# Patient Record
Sex: MALE | Race: OTHER
[De-identification: names, ages, dates, MRNs, and addresses within clinical notes are randomized per-mention and may not be internally consistent; named-entity substitution may affect disease eponyms.]

---

## 2017-10-09 ENCOUNTER — HOSPITAL ENCOUNTER (OUTPATIENT)
Dept: HOSPITAL 62 - RAD | Age: 67
End: 2017-10-09
Attending: SURGERY
Payer: MEDICARE

## 2017-10-09 DIAGNOSIS — K80.20: Primary | ICD-10-CM

## 2017-10-09 PROCEDURE — 76705 ECHO EXAM OF ABDOMEN: CPT

## 2017-10-09 NOTE — RADIOLOGY REPORT (SQ)
EXAM DESCRIPTION:  U/S ABDOMEN LIMITED W/O DOP



COMPLETED DATE/TIME:  10/9/2017 9:48 am



REASON FOR STUDY:  CALC OF GALLBLADDER WITHOUT CHOLECYSTITIS K80.20  CALCULUS OF GALLBLADDER W/O CHOL
ECYSTITIS W/O OBSTRUC



COMPARISON:  CT chest 4/26/2016



TECHNIQUE:  Dynamic and static grayscale images acquired of the abdomen and recorded on PACS. Additio
nal selected color Doppler and spectral images recorded.



LIMITATIONS:  None.



FINDINGS:  PANCREAS: Midline pancreas unremarkable

LIVER: Difficult to penetrate with the ultrasound energy from diffuse fatty infiltration.  No gross m
asses.

LIVER VASCULATURE: Normal directional flow of the main portal vein and hepatic veins.

GALLBLADDER: Contracted, multiple shadowing stones are present.  No definite gallbladder wall thicken
ing far pericholecystic fluid.

ULTRASOUND-DETECTED DONOHUE'S SIGN: Negative.

INTRAHEPATIC DUCTS AND COMMON DUCT: CBD and intrahepatic ducts normal caliber. No filling defects.

INFERIOR VENA CAVA: Not well seen

AORTA: No aneurysm.

RIGHT KIDNEY:  Normal size. Normal echogenicity. No solid or suspicious masses.  Right kidney 1.6 cm 
in length.  No hydronephrosis. No calcifications.

PERITONEAL AND RIGHT PLEURAL SPACE: No ascites or effusions.

OTHER: No other significant findings.



IMPRESSION:  Gallbladder contracted around multiple stones.  No pericholecystic fluid or definite gal
lbladder wall thickening.  Negative sonographic Donohue's sign

Echogenic liver difficult to penetrate with the ultrasound energy likely from fatty infiltration



TECHNICAL DOCUMENTATION:  JOB ID:  [id number]

 2011 [employer]- All Rights Reserved

## 2018-06-06 ENCOUNTER — HOSPITAL ENCOUNTER (OUTPATIENT)
Dept: HOSPITAL 62 - SC | Age: 68
Discharge: HOME | End: 2018-06-06
Attending: OPHTHALMOLOGY
Payer: MEDICARE

## 2018-06-06 DIAGNOSIS — Z96.653: ICD-10-CM

## 2018-06-06 DIAGNOSIS — H25.11: Primary | ICD-10-CM

## 2018-06-06 DIAGNOSIS — I10: ICD-10-CM

## 2018-06-06 DIAGNOSIS — Z87.891: ICD-10-CM

## 2018-06-06 DIAGNOSIS — Z79.899: ICD-10-CM

## 2018-06-06 DIAGNOSIS — H40.1111: ICD-10-CM

## 2018-06-06 PROCEDURE — 66984 XCAPSL CTRC RMVL W/O ECP: CPT

## 2018-06-06 PROCEDURE — V2630 ANTER CHAMBER INTRAOCUL LENS: HCPCS

## 2018-06-06 PROCEDURE — 0191T: CPT

## 2018-06-06 PROCEDURE — C1783 OCULAR IMP, AQUEOUS DRAIN DE: HCPCS

## 2018-06-06 RX ADMIN — CYCLOPENTOLATE HYDROCHLORIDE AND PHENYLEPHRINE HYDROCHLORIDE PRN DROP: 2; 10 SOLUTION/ DROPS OPHTHALMIC at 09:48

## 2018-06-06 RX ADMIN — TETRACAINE HYDROCHLORIDE PRN DROP: 25 LIQUID OPHTHALMIC at 09:49

## 2018-06-06 RX ADMIN — CYCLOPENTOLATE HYDROCHLORIDE AND PHENYLEPHRINE HYDROCHLORIDE PRN DROP: 2; 10 SOLUTION/ DROPS OPHTHALMIC at 10:08

## 2018-06-06 RX ADMIN — TROPICAMIDE PRN DROP: 10 SOLUTION/ DROPS OPHTHALMIC at 09:48

## 2018-06-06 RX ADMIN — BESIFLOXACIN PRN DROP: 6 SUSPENSION OPHTHALMIC at 10:31

## 2018-06-06 RX ADMIN — TROPICAMIDE PRN DROP: 10 SOLUTION/ DROPS OPHTHALMIC at 09:58

## 2018-06-06 RX ADMIN — BESIFLOXACIN PRN DROP: 6 SUSPENSION OPHTHALMIC at 09:59

## 2018-06-06 RX ADMIN — TETRACAINE HYDROCHLORIDE PRN DROP: 25 LIQUID OPHTHALMIC at 10:11

## 2018-06-06 RX ADMIN — TETRACAINE HYDROCHLORIDE PRN DROP: 25 LIQUID OPHTHALMIC at 10:08

## 2018-06-06 RX ADMIN — TROPICAMIDE PRN DROP: 10 SOLUTION/ DROPS OPHTHALMIC at 10:08

## 2018-06-06 RX ADMIN — BESIFLOXACIN PRN DROP: 6 SUSPENSION OPHTHALMIC at 09:49

## 2018-06-06 RX ADMIN — CYCLOPENTOLATE HYDROCHLORIDE AND PHENYLEPHRINE HYDROCHLORIDE PRN DROP: 2; 10 SOLUTION/ DROPS OPHTHALMIC at 09:58

## 2018-06-20 ENCOUNTER — HOSPITAL ENCOUNTER (OUTPATIENT)
Dept: HOSPITAL 62 - SC | Age: 68
Discharge: HOME | End: 2018-06-20
Attending: OPHTHALMOLOGY
Payer: MEDICARE

## 2018-06-20 DIAGNOSIS — H25.12: Primary | ICD-10-CM

## 2018-06-20 DIAGNOSIS — Z79.899: ICD-10-CM

## 2018-06-20 DIAGNOSIS — Z96.653: ICD-10-CM

## 2018-06-20 DIAGNOSIS — I10: ICD-10-CM

## 2018-06-20 DIAGNOSIS — H40.1121: ICD-10-CM

## 2018-06-20 DIAGNOSIS — Z98.41: ICD-10-CM

## 2018-06-20 PROCEDURE — 66984 XCAPSL CTRC RMVL W/O ECP: CPT

## 2018-06-20 PROCEDURE — 0191T: CPT

## 2018-06-20 PROCEDURE — V2630 ANTER CHAMBER INTRAOCUL LENS: HCPCS

## 2018-06-20 PROCEDURE — C1783 OCULAR IMP, AQUEOUS DRAIN DE: HCPCS

## 2018-06-20 RX ADMIN — TOBRAMYCIN AND DEXAMETHASONE ONE APPLIC: 3; 1 OINTMENT OPHTHALMIC at 09:34

## 2018-06-20 RX ADMIN — KETOROLAC TROMETHAMINE PRN DROP: 4.5 SOLUTION/ DROPS OPHTHALMIC at 08:31

## 2018-06-20 RX ADMIN — CYCLOPENTOLATE HYDROCHLORIDE AND PHENYLEPHRINE HYDROCHLORIDE PRN DROP: 2; 10 SOLUTION/ DROPS OPHTHALMIC at 08:50

## 2018-06-20 RX ADMIN — BESIFLOXACIN PRN DROP: 6 SUSPENSION OPHTHALMIC at 08:41

## 2018-06-20 RX ADMIN — TROPICAMIDE PRN DROP: 10 SOLUTION/ DROPS OPHTHALMIC at 08:50

## 2018-06-20 RX ADMIN — TROPICAMIDE PRN DROP: 10 SOLUTION/ DROPS OPHTHALMIC at 08:30

## 2018-06-20 RX ADMIN — TOBRAMYCIN AND DEXAMETHASONE ONE APPLIC: 3; 1 OINTMENT OPHTHALMIC at 09:43

## 2018-06-20 RX ADMIN — KETOROLAC TROMETHAMINE PRN DROP: 4.5 SOLUTION/ DROPS OPHTHALMIC at 08:30

## 2018-06-20 RX ADMIN — TETRACAINE HYDROCHLORIDE PRN DROP: 25 LIQUID OPHTHALMIC at 09:22

## 2018-06-20 RX ADMIN — TROPICAMIDE PRN DROP: 10 SOLUTION/ DROPS OPHTHALMIC at 08:40

## 2018-06-20 RX ADMIN — BESIFLOXACIN PRN DROP: 6 SUSPENSION OPHTHALMIC at 09:43

## 2018-06-20 RX ADMIN — BESIFLOXACIN PRN DROP: 6 SUSPENSION OPHTHALMIC at 09:34

## 2018-06-20 RX ADMIN — TETRACAINE HYDROCHLORIDE PRN DROP: 25 LIQUID OPHTHALMIC at 09:10

## 2018-06-20 RX ADMIN — CYCLOPENTOLATE HYDROCHLORIDE AND PHENYLEPHRINE HYDROCHLORIDE PRN DROP: 2; 10 SOLUTION/ DROPS OPHTHALMIC at 08:40

## 2018-06-20 RX ADMIN — CYCLOPENTOLATE HYDROCHLORIDE AND PHENYLEPHRINE HYDROCHLORIDE PRN DROP: 2; 10 SOLUTION/ DROPS OPHTHALMIC at 08:30

## 2018-06-20 RX ADMIN — TETRACAINE HYDROCHLORIDE PRN DROP: 25 LIQUID OPHTHALMIC at 08:31
